# Patient Record
Sex: FEMALE | Race: WHITE | NOT HISPANIC OR LATINO | Employment: FULL TIME | URBAN - METROPOLITAN AREA
[De-identification: names, ages, dates, MRNs, and addresses within clinical notes are randomized per-mention and may not be internally consistent; named-entity substitution may affect disease eponyms.]

---

## 2017-05-06 ENCOUNTER — HOSPITAL ENCOUNTER (EMERGENCY)
Facility: HOSPITAL | Age: 27
Discharge: HOME | End: 2017-05-06
Attending: EMERGENCY MEDICINE | Admitting: EMERGENCY MEDICINE
Payer: COMMERCIAL

## 2017-05-06 ENCOUNTER — APPOINTMENT (EMERGENCY)
Dept: CT IMAGING | Facility: HOSPITAL | Age: 27
End: 2017-05-06
Payer: COMMERCIAL

## 2017-05-06 VITALS
DIASTOLIC BLOOD PRESSURE: 54 MMHG | OXYGEN SATURATION: 97 % | HEIGHT: 62 IN | RESPIRATION RATE: 18 BRPM | TEMPERATURE: 97.8 F | BODY MASS INDEX: 23 KG/M2 | HEART RATE: 57 BPM | SYSTOLIC BLOOD PRESSURE: 99 MMHG | WEIGHT: 125 LBS

## 2017-05-06 DIAGNOSIS — N39.0 UTI (URINARY TRACT INFECTION): Primary | ICD-10-CM

## 2017-05-06 DIAGNOSIS — M54.9 BACK PAIN: ICD-10-CM

## 2017-05-06 LAB
ANION GAP SERPL CALCULATED.3IONS-SCNC: 9 MMOL/L (ref 4–13)
BASOPHILS # BLD AUTO: 0.01 THOUSANDS/ΜL (ref 0–0.1)
BASOPHILS NFR BLD AUTO: 0 % (ref 0–1)
BUN SERPL-MCNC: 12 MG/DL (ref 5–25)
CALCIUM SERPL-MCNC: 8.9 MG/DL (ref 8.3–10.1)
CHLORIDE SERPL-SCNC: 106 MMOL/L (ref 100–108)
CLARITY, POC: NORMAL
CO2 SERPL-SCNC: 27 MMOL/L (ref 21–32)
COLOR, POC: YELLOW
CREAT SERPL-MCNC: 0.61 MG/DL (ref 0.6–1.3)
EOSINOPHIL # BLD AUTO: 0.14 THOUSAND/ΜL (ref 0–0.61)
EOSINOPHIL NFR BLD AUTO: 2 % (ref 0–6)
ERYTHROCYTE [DISTWIDTH] IN BLOOD BY AUTOMATED COUNT: 12.3 % (ref 11.6–15.1)
EXT BILIRUBIN, UA: NEGATIVE
EXT BLOOD URINE: NORMAL
EXT GLUCOSE, UA: NEGATIVE
EXT KETONES: NEGATIVE
EXT NITRITE, UA: NEGATIVE
EXT PH, UA: 6.5
EXT PROTEIN, UA: NORMAL
EXT SPECIFIC GRAVITY, UA: 1.01
EXT UROBILINOGEN: 0.2
GFR SERPL CREATININE-BSD FRML MDRD: >60 ML/MIN/1.73SQ M
GLUCOSE SERPL-MCNC: 89 MG/DL (ref 65–140)
HCG UR QL: NEGATIVE
HCT VFR BLD AUTO: 39.3 % (ref 34.8–46.1)
HGB BLD-MCNC: 13.2 G/DL (ref 11.5–15.4)
LYMPHOCYTES # BLD AUTO: 2.99 THOUSANDS/ΜL (ref 0.6–4.47)
LYMPHOCYTES NFR BLD AUTO: 35 % (ref 14–44)
MCH RBC QN AUTO: 31.1 PG (ref 26.8–34.3)
MCHC RBC AUTO-ENTMCNC: 33.6 G/DL (ref 31.4–37.4)
MCV RBC AUTO: 93 FL (ref 82–98)
MONOCYTES # BLD AUTO: 0.56 THOUSAND/ΜL (ref 0.17–1.22)
MONOCYTES NFR BLD AUTO: 7 % (ref 4–12)
NEUTROPHILS # BLD AUTO: 4.8 THOUSANDS/ΜL (ref 1.85–7.62)
NEUTS SEG NFR BLD AUTO: 56 % (ref 43–75)
NRBC BLD AUTO-RTO: 0 /100 WBCS
PLATELET # BLD AUTO: 232 THOUSANDS/UL (ref 149–390)
PMV BLD AUTO: 10.3 FL (ref 8.9–12.7)
POTASSIUM SERPL-SCNC: 3.8 MMOL/L (ref 3.5–5.3)
RBC # BLD AUTO: 4.25 MILLION/UL (ref 3.81–5.12)
SODIUM SERPL-SCNC: 142 MMOL/L (ref 136–145)
WBC # BLD AUTO: 8.53 THOUSAND/UL (ref 4.31–10.16)
WBC # BLD EST: NEGATIVE 10*3/UL

## 2017-05-06 PROCEDURE — 80048 BASIC METABOLIC PNL TOTAL CA: CPT | Performed by: PHYSICIAN ASSISTANT

## 2017-05-06 PROCEDURE — 99284 EMERGENCY DEPT VISIT MOD MDM: CPT

## 2017-05-06 PROCEDURE — 74176 CT ABD & PELVIS W/O CONTRAST: CPT

## 2017-05-06 PROCEDURE — 81025 URINE PREGNANCY TEST: CPT

## 2017-05-06 PROCEDURE — 81002 URINALYSIS NONAUTO W/O SCOPE: CPT

## 2017-05-06 PROCEDURE — 85025 COMPLETE CBC W/AUTO DIFF WBC: CPT | Performed by: PHYSICIAN ASSISTANT

## 2017-05-06 PROCEDURE — 36415 COLL VENOUS BLD VENIPUNCTURE: CPT | Performed by: PHYSICIAN ASSISTANT

## 2017-05-06 RX ORDER — CEPHALEXIN 500 MG/1
500 CAPSULE ORAL 3 TIMES DAILY
Qty: 21 CAPSULE | Refills: 0 | Status: SHIPPED | OUTPATIENT
Start: 2017-05-06 | End: 2017-05-13

## 2017-05-06 RX ORDER — NORGESTIMATE AND ETHINYL ESTRADIOL 0.25-0.035
1 KIT ORAL DAILY
COMMUNITY

## 2017-05-06 RX ORDER — PHENAZOPYRIDINE HYDROCHLORIDE 100 MG/1
100 TABLET, FILM COATED ORAL 3 TIMES DAILY PRN
Qty: 6 TABLET | Refills: 0 | Status: SHIPPED | OUTPATIENT
Start: 2017-05-06 | End: 2017-05-08

## 2017-05-06 RX ORDER — TRAMADOL HYDROCHLORIDE 50 MG/1
50 TABLET ORAL EVERY 6 HOURS PRN
Qty: 20 TABLET | Refills: 0 | Status: SHIPPED | OUTPATIENT
Start: 2017-05-06

## 2020-03-05 ENCOUNTER — HOSPITAL ENCOUNTER (EMERGENCY)
Facility: HOSPITAL | Age: 30
Discharge: HOME | End: 2020-03-05
Attending: EMERGENCY MEDICINE | Admitting: EMERGENCY MEDICINE
Payer: COMMERCIAL

## 2020-03-05 ENCOUNTER — APPOINTMENT (EMERGENCY)
Dept: RADIOLOGY | Facility: HOSPITAL | Age: 30
End: 2020-03-05
Payer: COMMERCIAL

## 2020-03-05 VITALS
RESPIRATION RATE: 20 BRPM | SYSTOLIC BLOOD PRESSURE: 126 MMHG | TEMPERATURE: 98 F | OXYGEN SATURATION: 99 % | DIASTOLIC BLOOD PRESSURE: 74 MMHG | HEART RATE: 83 BPM | WEIGHT: 115 LBS | BODY MASS INDEX: 21.16 KG/M2 | HEIGHT: 62 IN

## 2020-03-05 DIAGNOSIS — M77.8 TENDINITIS OF RIGHT WRIST: Primary | ICD-10-CM

## 2020-03-05 PROCEDURE — 29125 APPL SHORT ARM SPLINT STATIC: CPT | Performed by: PHYSICIAN ASSISTANT

## 2020-03-05 PROCEDURE — 73110 X-RAY EXAM OF WRIST: CPT

## 2020-03-05 PROCEDURE — 99283 EMERGENCY DEPT VISIT LOW MDM: CPT

## 2020-03-05 PROCEDURE — 99284 EMERGENCY DEPT VISIT MOD MDM: CPT | Performed by: PHYSICIAN ASSISTANT

## 2020-03-05 RX ORDER — IBUPROFEN 400 MG/1
400 TABLET ORAL EVERY 6 HOURS PRN
Qty: 30 TABLET | Refills: 0 | Status: SHIPPED | OUTPATIENT
Start: 2020-03-05 | End: 2020-03-10

## 2020-03-05 NOTE — ED PROVIDER NOTES
History  Chief Complaint   Patient presents with    Wrist Pain     Pt presents for R wrist pain, states scooping ice cream at work has aggravated her arthritis      30 y o  female presents to ED with chief complaint of wrist pain  Onset of symptoms reported as yesterday  Location of symptoms is reported as the right wrist  Quality is reported as sharp pain  Severity is reported as moderate  Associated symptoms:   Denies paralysis, paraesthesias or weakness to upper extremity  Denies discoloration of hand, fingers or skin of upper extremity  Denies fevers  Denies elbow or shoulder pain  Denies neck pain  Denies rash  Modifiers: patient reports movement exacerbates pain  Context:    Patient reports she developed r wrist pain after scooping ice cream at work yesterday  Patient is right hand dominant  Denies other injuries or pain  Patient reports a history of arthritis in the past and states this activity has aggravating her arthritis  Medical summary: review of past visit history via [facility] demonstrates: patient last seen in ed on 2017 for evaluation of uti          History provided by:  Patient   used: No        Prior to Admission Medications   Prescriptions Last Dose Informant Patient Reported? Taking?   norgestimate-ethinyl estradiol (ORTHO-CYCLEN) 0 25-35 MG-MCG per tablet   Yes No   Sig: Take 1 tablet by mouth daily   traMADol (ULTRAM) 50 mg tablet   No No   Sig: Take 1 tablet by mouth every 6 (six) hours as needed for moderate pain (abd pain/initial rx) for up to 20 doses      Facility-Administered Medications: None       Past Medical History:   Diagnosis Date    Ovarian cyst     Pancreatitis     Renal disorder     kidney stones       Past Surgical History:   Procedure Laterality Date     SECTION         History reviewed  No pertinent family history  I have reviewed and agree with the history as documented      E-Cigarette/Vaping     E-Cigarette/Vaping Substances     Social History     Tobacco Use    Smoking status: Current Every Day Smoker     Packs/day: 0 50     Types: Cigarettes    Smokeless tobacco: Never Used   Substance Use Topics    Alcohol use: No    Drug use: No       Review of Systems   Constitutional: Negative for activity change, appetite change, chills, diaphoresis, fatigue, fever and unexpected weight change  HENT: Negative for congestion, dental problem, drooling, ear discharge, ear pain, facial swelling, hearing loss, mouth sores, nosebleeds, postnasal drip, rhinorrhea, sinus pressure, sinus pain, sneezing, sore throat, tinnitus, trouble swallowing and voice change  Eyes: Negative for photophobia, pain, discharge, redness, itching and visual disturbance  Respiratory: Negative for apnea, cough, choking, chest tightness, shortness of breath, wheezing and stridor  Cardiovascular: Negative for chest pain, palpitations and leg swelling  Gastrointestinal: Negative for abdominal distention, abdominal pain, anal bleeding, blood in stool, constipation, diarrhea, nausea, rectal pain and vomiting  Endocrine: Negative for cold intolerance, heat intolerance, polydipsia, polyphagia and polyuria  Genitourinary: Negative for decreased urine volume, difficulty urinating, dyspareunia, dysuria, enuresis, flank pain, frequency, hematuria, menstrual problem, pelvic pain, urgency, vaginal bleeding, vaginal discharge and vaginal pain  Musculoskeletal: Positive for arthralgias  Negative for back pain, gait problem, joint swelling, myalgias, neck pain and neck stiffness  Skin: Negative for color change, pallor, rash and wound  Allergic/Immunologic: Negative for environmental allergies, food allergies and immunocompromised state  Neurological: Negative for dizziness, tremors, seizures, syncope, facial asymmetry, speech difficulty, weakness, light-headedness, numbness and headaches  Hematological: Negative for adenopathy  Does not bruise/bleed easily  Psychiatric/Behavioral: Negative for agitation, confusion, hallucinations, self-injury and suicidal ideas  The patient is not hyperactive  All other systems reviewed and are negative  Physical Exam  Physical Exam   Constitutional: She is oriented to person, place, and time  She appears well-developed and well-nourished  No distress  /74 (BP Location: Right arm)   Pulse 83   Temp 98 °F (36 7 °C) (Oral)   Resp 20   Ht 5' 2" (1 575 m)   Wt 52 2 kg (115 lb)   SpO2 99%   BMI 21 03 kg/m²    HENT:   Head: Normocephalic and atraumatic  Right Ear: External ear normal    Left Ear: External ear normal    Nose: Nose normal    Mouth/Throat: Oropharynx is clear and moist  No oropharyngeal exudate  Eyes: Pupils are equal, round, and reactive to light  Conjunctivae and EOM are normal  Right eye exhibits no discharge  Left eye exhibits no discharge  No scleral icterus  Neck: Normal range of motion  Neck supple  No JVD present  No tracheal deviation present  Cardiovascular: Normal rate, regular rhythm and intact distal pulses  Pulmonary/Chest: Effort normal and breath sounds normal  No stridor  No respiratory distress  She has no wheezes  She has no rales  She exhibits no tenderness  Abdominal: Soft  Bowel sounds are normal  She exhibits no distension and no mass  There is no tenderness  There is no rebound and no guarding  No hernia  Musculoskeletal: Normal range of motion  She exhibits tenderness  She exhibits no edema or deformity  Right Wrist exam:  normal inspection, no gross deformity, no obvious muscle atrophy on inspection  Sensory intact to light touch and neurovascularly intact to all surfaces  Capillary refill is less than 3 seconds  Dorsal surface is nontender to palpation, volar surface is nontender to palpation  Radial surface is nontender to palpation  Ulnar surface is tender to palpation  ROM is full and intact    Hand is normal to inspection, nontender to palpation with full range of motion  Forearm and elbow are normal to inspection, nontender to palpation with full range of motion  Finkelsteins test is negative  Lymphadenopathy:     She has no cervical adenopathy  Neurological: She is alert and oriented to person, place, and time  She displays normal reflexes  No cranial nerve deficit or sensory deficit  She exhibits normal muscle tone  Coordination normal    Skin: Skin is warm and dry  Capillary refill takes less than 2 seconds  No rash noted  She is not diaphoretic  No erythema  No pallor  Psychiatric: She has a normal mood and affect  Her behavior is normal  Judgment and thought content normal    Nursing note and vitals reviewed  Vital Signs  ED Triage Vitals [03/05/20 1020]   Temperature Pulse Respirations Blood Pressure SpO2   98 °F (36 7 °C) 83 20 126/74 99 %      Temp Source Heart Rate Source Patient Position - Orthostatic VS BP Location FiO2 (%)   Oral Monitor Sitting Right arm --      Pain Score       8           Vitals:    03/05/20 1020   BP: 126/74   Pulse: 83   Patient Position - Orthostatic VS: Sitting         Visual Acuity      ED Medications  Medications - No data to display    Diagnostic Studies  Results Reviewed     None                 XR wrist 3+ views RIGHT   Final Result by Jaylin Bro MD (03/05 1111)      No acute osseous abnormality  Workstation performed: [id number]                    Procedures  Procedures         ED Course                               MDM  Number of Diagnoses or Management Options  Tendinitis of right wrist: new and requires workup  Diagnosis management comments: ddx includes but is not limited to fracture, contusion, sprain, strain, nerve injury, tendon injury, vascular injury, tendinitis, bursitis, dislocation, plan xray to rule out fracture or dislocation  Xray images r wrist independently visualized and interpreted by me - no acute fracture or dislocation       Discussed x-ray results with patient at bedside  Discussed diagnosis of wrist tendinitis  Discussed expected course and treatment plan  Discussed rest, ice, use of splint  Discussed use of NSAIDs  Discussed avoid activities that aggravate symptoms for the next 5 days  Follow up with primary care physician and Orthopedics in 3-5 days for recheck and further evaluation of symptoms  Reviewed reasons to return to ed  Patient verbalized understanding of diagnosis and agreement with discharge plan of care as well as understanding of reasons to return to ed  I have reasonably determine that electronically prescribing a controlled substance would be impractical for the patient to obtain the controlled substance prescribed by electronic prescription or would cause an untimely delay resulting in an adverse impact on the patient's medical condition   Splint check: location right wrist,   Type static wrist splint, SILT, NVI, cap refill less than 3 seconds  Skin intact without redness or breakdown  Splint applied by ed tech  Splint checked by me  Amount and/or Complexity of Data Reviewed  Tests in the radiology section of CPT®: ordered and reviewed  Discussion of test results with the performing providers: yes  Review and summarize past medical records: yes  Independent visualization of images, tracings, or specimens: yes    Patient Progress  Patient progress: stable        Disposition  Final diagnoses:   Tendinitis of right wrist     Time reflects when diagnosis was documented in both MDM as applicable and the Disposition within this note     Time User Action Codes Description Comment    3/5/2020 10:33 AM Eliane Banda Add [M77 8] Tendinitis of right wrist       ED Disposition     ED Disposition Condition Date/Time Comment    Discharge Stable Thu Mar 5, 2020 10:33 AM Manjula Duffy discharge to home/self care              Follow-up Information     Follow up With Specialties Details Why Contact Info Additional Information    [facility] Emergency Department Emergency Medicine Go to  If symptoms worsen [address] [id number] [phone] [place] [facility], [address] [facility], [place], [place], [address]  Call in 1 day for further evaluation of symptoms [address] [facility],[address]  [address]  [phone]           Discharge Medication List as of 3/5/2020 10:36 AM      START taking these medications    Details   ibuprofen (MOTRIN) 400 mg tablet Take 1 tablet (400 mg total) by mouth every 6 (six) hours as needed for moderate pain (wrist pain/initial rx ) for up to 5 days, Starting u 3/5/2020, Until Tue 3/10/2020, Print         CONTINUE these medications which have NOT CHANGED    Details   norgestimate-ethinyl estradiol (ORTHO-CYCLEN) 0 25-35 MG-MCG per tablet Take 1 tablet by mouth daily, Until Discontinued, Historical Med      traMADol (ULTRAM) 50 mg tablet Take 1 tablet by mouth every 6 (six) hours as needed for moderate pain (abd pain/initial rx) for up to 20 doses, Starting 5/6/2017, Until Discontinued, Print           No discharge procedures on file      PDMP Review     None          ED Provider  Electronically Signed by           Daniel Collado PA-C  03/06/20 5289

## 2020-03-05 NOTE — ED NOTES
D/c reviewed with pt  Pt verbalized understanding and has no further questions at this time  Pt ambulatory off unit with steady gait       Cedrick Shepard RN  03/05/20 6346

## 2024-08-19 ENCOUNTER — APPOINTMENT (EMERGENCY)
Dept: CT IMAGING | Facility: HOSPITAL | Age: 34
End: 2024-08-19
Payer: COMMERCIAL

## 2024-08-19 ENCOUNTER — HOSPITAL ENCOUNTER (EMERGENCY)
Facility: HOSPITAL | Age: 34
Discharge: HOME | End: 2024-08-19
Attending: EMERGENCY MEDICINE
Payer: COMMERCIAL

## 2024-08-19 VITALS
SYSTOLIC BLOOD PRESSURE: 115 MMHG | HEART RATE: 91 BPM | RESPIRATION RATE: 32 BRPM | BODY MASS INDEX: 31.4 KG/M2 | DIASTOLIC BLOOD PRESSURE: 78 MMHG | WEIGHT: 170.64 LBS | TEMPERATURE: 98.1 F | OXYGEN SATURATION: 98 % | HEIGHT: 62 IN

## 2024-08-19 DIAGNOSIS — F10.929 ALCOHOL INTOXICATION (HCC): Primary | ICD-10-CM

## 2024-08-19 DIAGNOSIS — E87.6 HYPOKALEMIA: ICD-10-CM

## 2024-08-19 LAB
2HR DELTA HS TROPONIN: <-2 NG/L
ALBUMIN SERPL BCG-MCNC: 4.2 G/DL (ref 3.5–5)
ALP SERPL-CCNC: 46 U/L (ref 34–104)
ALT SERPL W P-5'-P-CCNC: 18 U/L (ref 7–52)
ANION GAP SERPL CALCULATED.3IONS-SCNC: 11 MMOL/L (ref 4–13)
APAP SERPL-MCNC: <2 UG/ML (ref 10–20)
AST SERPL W P-5'-P-CCNC: 21 U/L (ref 13–39)
ATRIAL RATE: 85 BPM
BASE EX.OXY STD BLDV CALC-SCNC: 86.1 % (ref 60–80)
BASE EXCESS BLDV CALC-SCNC: -3.4 MMOL/L
BASOPHILS # BLD AUTO: 0.02 THOUSANDS/ÂΜL (ref 0–0.1)
BASOPHILS NFR BLD AUTO: 0 % (ref 0–1)
BILIRUB SERPL-MCNC: 0.27 MG/DL (ref 0.2–1)
BUN SERPL-MCNC: 17 MG/DL (ref 5–25)
CALCIUM SERPL-MCNC: 8.7 MG/DL (ref 8.4–10.2)
CARDIAC TROPONIN I PNL SERPL HS: 4 NG/L
CARDIAC TROPONIN I PNL SERPL HS: <2 NG/L
CHLORIDE SERPL-SCNC: 108 MMOL/L (ref 96–108)
CK SERPL-CCNC: 140 U/L (ref 26–192)
CO2 SERPL-SCNC: 23 MMOL/L (ref 21–32)
CREAT SERPL-MCNC: 0.58 MG/DL (ref 0.6–1.3)
EOSINOPHIL # BLD AUTO: 0.09 THOUSAND/ÂΜL (ref 0–0.61)
EOSINOPHIL NFR BLD AUTO: 1 % (ref 0–6)
ERYTHROCYTE [DISTWIDTH] IN BLOOD BY AUTOMATED COUNT: 13.4 % (ref 11.6–15.1)
ETHANOL EXG-MCNC: 0.08 MG/DL
ETHANOL SERPL-MCNC: 273 MG/DL
GFR SERPL CREATININE-BSD FRML MDRD: 119 ML/MIN/1.73SQ M
GLUCOSE SERPL-MCNC: 117 MG/DL (ref 65–140)
HCG SERPL QL: NEGATIVE
HCO3 BLDV-SCNC: 21.1 MMOL/L (ref 24–30)
HCT VFR BLD AUTO: 40.9 % (ref 34.8–46.1)
HGB BLD-MCNC: 13.6 G/DL (ref 11.5–15.4)
IMM GRANULOCYTES # BLD AUTO: 0.08 THOUSAND/UL (ref 0–0.2)
IMM GRANULOCYTES NFR BLD AUTO: 1 % (ref 0–2)
LACTATE SERPL-SCNC: 1.6 MMOL/L (ref 0.5–2)
LYMPHOCYTES # BLD AUTO: 2.16 THOUSANDS/ÂΜL (ref 0.6–4.47)
LYMPHOCYTES NFR BLD AUTO: 17 % (ref 14–44)
MCH RBC QN AUTO: 30.1 PG (ref 26.8–34.3)
MCHC RBC AUTO-ENTMCNC: 33.3 G/DL (ref 31.4–37.4)
MCV RBC AUTO: 91 FL (ref 82–98)
MONOCYTES # BLD AUTO: 0.39 THOUSAND/ÂΜL (ref 0.17–1.22)
MONOCYTES NFR BLD AUTO: 3 % (ref 4–12)
NEUTROPHILS # BLD AUTO: 9.77 THOUSANDS/ÂΜL (ref 1.85–7.62)
NEUTS SEG NFR BLD AUTO: 78 % (ref 43–75)
NRBC BLD AUTO-RTO: 0 /100 WBCS
O2 CT BLDV-SCNC: 17.9 ML/DL
P AXIS: 67 DEGREES
PCO2 BLDV: 36.4 MM HG (ref 42–50)
PH BLDV: 7.38 [PH] (ref 7.3–7.4)
PLATELET # BLD AUTO: 267 THOUSANDS/UL (ref 149–390)
PMV BLD AUTO: 9.5 FL (ref 8.9–12.7)
PO2 BLDV: 67.3 MM HG (ref 35–45)
POTASSIUM SERPL-SCNC: 3.4 MMOL/L (ref 3.5–5.3)
PR INTERVAL: 238 MS
PROT SERPL-MCNC: 6.9 G/DL (ref 6.4–8.4)
QRS AXIS: 102 DEGREES
QRSD INTERVAL: 94 MS
QT INTERVAL: 384 MS
QTC INTERVAL: 456 MS
RBC # BLD AUTO: 4.52 MILLION/UL (ref 3.81–5.12)
SALICYLATES SERPL-MCNC: <5 MG/DL (ref 3–20)
SODIUM SERPL-SCNC: 142 MMOL/L (ref 135–147)
T WAVE AXIS: 64 DEGREES
VENTRICULAR RATE: 85 BPM
WBC # BLD AUTO: 12.51 THOUSAND/UL (ref 4.31–10.16)

## 2024-08-19 PROCEDURE — 82077 ASSAY SPEC XCP UR&BREATH IA: CPT | Performed by: EMERGENCY MEDICINE

## 2024-08-19 PROCEDURE — 84484 ASSAY OF TROPONIN QUANT: CPT | Performed by: EMERGENCY MEDICINE

## 2024-08-19 PROCEDURE — 96366 THER/PROPH/DIAG IV INF ADDON: CPT

## 2024-08-19 PROCEDURE — 82075 ASSAY OF BREATH ETHANOL: CPT | Performed by: STUDENT IN AN ORGANIZED HEALTH CARE EDUCATION/TRAINING PROGRAM

## 2024-08-19 PROCEDURE — 82805 BLOOD GASES W/O2 SATURATION: CPT | Performed by: EMERGENCY MEDICINE

## 2024-08-19 PROCEDURE — 72125 CT NECK SPINE W/O DYE: CPT

## 2024-08-19 PROCEDURE — 93010 ELECTROCARDIOGRAM REPORT: CPT | Performed by: INTERNAL MEDICINE

## 2024-08-19 PROCEDURE — 85025 COMPLETE CBC W/AUTO DIFF WBC: CPT | Performed by: EMERGENCY MEDICINE

## 2024-08-19 PROCEDURE — 96361 HYDRATE IV INFUSION ADD-ON: CPT

## 2024-08-19 PROCEDURE — 36415 COLL VENOUS BLD VENIPUNCTURE: CPT | Performed by: EMERGENCY MEDICINE

## 2024-08-19 PROCEDURE — 82550 ASSAY OF CK (CPK): CPT | Performed by: EMERGENCY MEDICINE

## 2024-08-19 PROCEDURE — 80143 DRUG ASSAY ACETAMINOPHEN: CPT | Performed by: EMERGENCY MEDICINE

## 2024-08-19 PROCEDURE — 80053 COMPREHEN METABOLIC PANEL: CPT | Performed by: EMERGENCY MEDICINE

## 2024-08-19 PROCEDURE — 99284 EMERGENCY DEPT VISIT MOD MDM: CPT | Performed by: EMERGENCY MEDICINE

## 2024-08-19 PROCEDURE — 84703 CHORIONIC GONADOTROPIN ASSAY: CPT | Performed by: EMERGENCY MEDICINE

## 2024-08-19 PROCEDURE — 83605 ASSAY OF LACTIC ACID: CPT | Performed by: EMERGENCY MEDICINE

## 2024-08-19 PROCEDURE — 80179 DRUG ASSAY SALICYLATE: CPT | Performed by: EMERGENCY MEDICINE

## 2024-08-19 PROCEDURE — 99284 EMERGENCY DEPT VISIT MOD MDM: CPT

## 2024-08-19 PROCEDURE — 96365 THER/PROPH/DIAG IV INF INIT: CPT

## 2024-08-19 PROCEDURE — 93005 ELECTROCARDIOGRAM TRACING: CPT

## 2024-08-19 PROCEDURE — 70450 CT HEAD/BRAIN W/O DYE: CPT

## 2024-08-19 RX ORDER — LANOLIN ALCOHOL/MO/W.PET/CERES
100 CREAM (GRAM) TOPICAL DAILY
Status: DISCONTINUED | OUTPATIENT
Start: 2024-08-19 | End: 2024-08-19 | Stop reason: HOSPADM

## 2024-08-19 RX ORDER — FOLIC ACID 1 MG/1
1 TABLET ORAL DAILY
Status: DISCONTINUED | OUTPATIENT
Start: 2024-08-19 | End: 2024-08-19 | Stop reason: HOSPADM

## 2024-08-19 RX ORDER — ONDANSETRON 2 MG/ML
1 INJECTION INTRAMUSCULAR; INTRAVENOUS ONCE
Status: COMPLETED | OUTPATIENT
Start: 2024-08-19 | End: 2024-08-19

## 2024-08-19 RX ORDER — SODIUM CHLORIDE, SODIUM GLUCONATE, SODIUM ACETATE, POTASSIUM CHLORIDE, MAGNESIUM CHLORIDE, SODIUM PHOSPHATE, DIBASIC, AND POTASSIUM PHOSPHATE .53; .5; .37; .037; .03; .012; .00082 G/100ML; G/100ML; G/100ML; G/100ML; G/100ML; G/100ML; G/100ML
84 INJECTION, SOLUTION INTRAVENOUS CONTINUOUS
Status: DISCONTINUED | OUTPATIENT
Start: 2024-08-19 | End: 2024-08-19 | Stop reason: HOSPADM

## 2024-08-19 RX ORDER — POTASSIUM CHLORIDE 1500 MG/1
40 TABLET, EXTENDED RELEASE ORAL ONCE
Status: COMPLETED | OUTPATIENT
Start: 2024-08-19 | End: 2024-08-19

## 2024-08-19 RX ADMIN — SODIUM CHLORIDE 1000 ML: 0.9 INJECTION, SOLUTION INTRAVENOUS at 04:22

## 2024-08-19 RX ADMIN — Medication 1 TABLET: at 08:49

## 2024-08-19 RX ADMIN — FOLIC ACID 1 MG: 1 TABLET ORAL at 08:49

## 2024-08-19 RX ADMIN — THIAMINE HCL TAB 100 MG 100 MG: 100 TAB at 08:49

## 2024-08-19 RX ADMIN — POTASSIUM CHLORIDE 40 MEQ: 1500 TABLET, EXTENDED RELEASE ORAL at 07:56

## 2024-08-19 RX ADMIN — SODIUM CHLORIDE, SODIUM GLUCONATE, SODIUM ACETATE, POTASSIUM CHLORIDE, MAGNESIUM CHLORIDE, SODIUM PHOSPHATE, DIBASIC, AND POTASSIUM PHOSPHATE 84 ML/HR: .53; .5; .37; .037; .03; .012; .00082 INJECTION, SOLUTION INTRAVENOUS at 06:24

## 2024-08-19 NOTE — ED PROVIDER NOTES
"History  Chief Complaint   Patient presents with    Alcohol Intoxication     Pt came to ed by ambulance, family called 911 reporting  pt is intoxicated sleeping outside, on arrival pt is A&Ox3, breathing is spontaneous, unlabored. Abrasion noted to left knee     33-year-old female presents to the emergency room after reportedly being found intoxicated.    Patient awakens but does not answer questions appropriately.  When asked if anything hurts, patient states \"my alcohol.\"    Patient is unsure if she fell and struck her head that there was concerns for this.  Patient denies any headache though the reliability of her history is questionable.    Patient is maintaining her airway and is on capnography, no need for immediate airway intervention.  She is not actively vomiting though she was reportedly given ondansetron by EMS.    Impression and plan: Altered mental status with a broad differential though based on patient's history, concerns for potential alcohol intoxication though there is limited verifiable history.  As such, will obtain toxicologic and metabolic evaluation.  Will obtain CT imaging of patient's head and neck as I cannot clear her cervical spine clinically due to her intoxication.  Will monitor patient, reassess, and reevaluate.        Prior to Admission Medications   Prescriptions Last Dose Informant Patient Reported? Taking?   ibuprofen (MOTRIN) 400 mg tablet   No No   Sig: Take 1 tablet (400 mg total) by mouth every 6 (six) hours as needed for moderate pain (wrist pain/initial rx.) for up to 5 days   norgestimate-ethinyl estradiol (ORTHO-CYCLEN) 0.25-35 MG-MCG per tablet   Yes No   Sig: Take 1 tablet by mouth daily   traMADol (ULTRAM) 50 mg tablet   No No   Sig: Take 1 tablet by mouth every 6 (six) hours as needed for moderate pain (abd pain/initial rx) for up to 20 doses      Facility-Administered Medications: None       Past Medical History:   Diagnosis Date    Alcohol abuse     Alcoholism (HCC)  "    Ovarian cyst     Pancreatitis     Renal disorder     kidney stones       Past Surgical History:   Procedure Laterality Date     SECTION         No family history on file.  I have reviewed and agree with the history as documented.    E-Cigarette/Vaping     E-Cigarette/Vaping Substances     Social History     Tobacco Use    Smoking status: Every Day     Current packs/day: 0.50     Types: Cigarettes    Smokeless tobacco: Never   Substance Use Topics    Alcohol use: Yes     Comment: Pt drinks generally on wknds but sometimes more than just wknds    Drug use: Yes     Types: Marijuana       Review of Systems    Physical Exam  Physical Exam  Constitutional:       Comments: Awakens to verbal response.   HENT:      Head: Normocephalic and atraumatic.      Comments: No signs of basilar skull fracture.     Mouth/Throat:      Mouth: Mucous membranes are moist.   Eyes:      Conjunctiva/sclera: Conjunctivae normal.   Cardiovascular:      Rate and Rhythm: Normal rate and regular rhythm.   Pulmonary:      Effort: Pulmonary effort is normal.      Breath sounds: Normal breath sounds.   Abdominal:      Palpations: Abdomen is soft.      Tenderness: There is no abdominal tenderness. There is no guarding or rebound.   Musculoskeletal:      Cervical back: Neck supple. No rigidity or tenderness.   Skin:     General: Skin is warm and dry.   Neurological:      General: No focal deficit present.      Mental Status: She is alert.         Vital Signs  ED Triage Vitals   Temperature Pulse Respirations Blood Pressure SpO2   24 0400 24 0400 24 0400 24 0400 24 0400   98.1 °F (36.7 °C) 85 16 106/66 99 %      Temp Source Heart Rate Source Patient Position - Orthostatic VS BP Location FiO2 (%)   24 0400 24 0400 24 0700 24 0700 --   Oral Monitor Sitting Right arm       Pain Score       24 0400       No Pain           Vitals:    24 1000 24 1045 24 1100 24 1130    BP: 131/83 107/70 109/81 115/78   Pulse: (!) 109 82 92 91   Patient Position - Orthostatic VS: Sitting  Sitting Sitting         Visual Acuity  Visual Acuity      Flowsheet Row Most Recent Value   L Pupil Size (mm) 3   R Pupil Size (mm) 3            ED Medications  Medications   ondansetron (FOR EMS ONLY) (ZOFRAN) 4 mg/2 mL injection 4 mg (0 mg Does not apply Given to EMS 8/19/24 0412)   sodium chloride 0.9 % bolus 1,000 mL (0 mL Intravenous Stopped 8/19/24 0553)   potassium chloride (Klor-Con M20) CR tablet 40 mEq (40 mEq Oral Given 8/19/24 0756)       Diagnostic Studies  Results Reviewed       Procedure Component Value Units Date/Time    POCT alcohol breath test [[id number]]  (Normal) Resulted: 08/19/24 0923    Lab Status: Final result Updated: 08/19/24 0924     EXTBreath Alcohol 0.082    HS Troponin I 2hr [[id number]]  (Normal) Collected: 08/19/24 0625    Lab Status: Final result Specimen: Blood from Arm, Left Updated: 08/19/24 0659     hs TnI 2hr <2 ng/L      Delta 2hr hsTnI <-2 ng/L     Comprehensive metabolic panel [[id number]]  (Abnormal) Collected: 08/19/24 0410    Lab Status: Final result Specimen: Blood from Arm, Left Updated: 08/19/24 0530     Sodium 142 mmol/L      Potassium 3.4 mmol/L      Chloride 108 mmol/L      CO2 23 mmol/L      ANION GAP 11 mmol/L      BUN 17 mg/dL      Creatinine 0.58 mg/dL      Glucose 117 mg/dL      Calcium 8.7 mg/dL      AST 21 U/L      ALT 18 U/L      Alkaline Phosphatase 46 U/L      Total Protein 6.9 g/dL      Albumin 4.2 g/dL      Total Bilirubin 0.27 mg/dL      eGFR 119 ml/min/1.73sq m     Narrative:      National Kidney Disease Foundation guidelines for Chronic Kidney Disease (CKD):     Stage 1 with normal or high GFR (GFR > 90 mL/min/1.73 square meters)    Stage 2 Mild CKD (GFR = 60-89 mL/min/1.73 square meters)    Stage 3A Moderate CKD (GFR = 45-59 mL/min/1.73 square meters)    Stage 3B Moderate CKD (GFR = 30-44 mL/min/1.73 square meters)    Stage 4 Severe CKD (GFR = 15-29  mL/min/1.73 square meters)    Stage 5 End Stage CKD (GFR <15 mL/min/1.73 square meters)  Note: GFR calculation is accurate only with a steady state creatinine    CK [[id number]]  (Normal) Collected: 08/19/24 0410    Lab Status: Final result Specimen: Blood from Arm, Left Updated: 08/19/24 0530     Total  U/L     Salicylate level [[id number]]  (Normal) Collected: 08/19/24 0410    Lab Status: Final result Specimen: Blood from Arm, Left Updated: 08/19/24 0530     Salicylate Lvl <5 mg/dL     Acetaminophen level-If concentration is detectable, please discuss with medical  on call. [[id number]]  (Abnormal) Collected: 08/19/24 0410    Lab Status: Final result Specimen: Blood from Arm, Left Updated: 08/19/24 0530     Acetaminophen Level <2 ug/mL     hCG, qualitative pregnancy [[id number]]  (Normal) Collected: 08/19/24 0410    Lab Status: Final result Specimen: Blood from Arm, Left Updated: 08/19/24 0513     Preg, Serum Negative    HS Troponin 0hr (reflex protocol) [[id number]]  (Normal) Collected: 08/19/24 0410    Lab Status: Final result Specimen: Blood from Arm, Left Updated: 08/19/24 0512     hs TnI 0hr 4 ng/L     Lactic acid, plasma (w/reflex if result > 2.0) [[id number]]  (Normal) Collected: 08/19/24 0410    Lab Status: Final result Specimen: Blood from Arm, Left Updated: 08/19/24 0456     LACTIC ACID 1.6 mmol/L     Narrative:      Result may be elevated if tourniquet was used during collection.    Ethanol [[id number]]  (Abnormal) Collected: 08/19/24 0410    Lab Status: Final result Specimen: Blood from Arm, Left Updated: 08/19/24 0436     Ethanol Lvl 273 mg/dL     Blood gas, venous [[id number]]  (Abnormal) Collected: 08/19/24 0410    Lab Status: Final result Specimen: Blood from Arm, Left Updated: 08/19/24 0420     pH, Lalo 7.381     pCO2, Lalo 36.4 mm Hg      pO2, Lalo 67.3 mm Hg      HCO3, Lalo 21.1 mmol/L      Base Excess, Lalo -3.4 mmol/L      O2 Content, Lalo 17.9 ml/dL      O2 HGB, VENOUS 86.1 %     CBC and  differential [[id number]]  (Abnormal) Collected: 08/19/24 0410    Lab Status: Final result Specimen: Blood from Arm, Left Updated: 08/19/24 0419     WBC 12.51 Thousand/uL      RBC 4.52 Million/uL      Hemoglobin 13.6 g/dL      Hematocrit 40.9 %      MCV 91 fL      MCH 30.1 pg      MCHC 33.3 g/dL      RDW 13.4 %      MPV 9.5 fL      Platelets 267 Thousands/uL      nRBC 0 /100 WBCs      Segmented % 78 %      Immature Grans % 1 %      Lymphocytes % 17 %      Monocytes % 3 %      Eosinophils Relative 1 %      Basophils Relative 0 %      Absolute Neutrophils 9.77 Thousands/µL      Absolute Immature Grans 0.08 Thousand/uL      Absolute Lymphocytes 2.16 Thousands/µL      Absolute Monocytes 0.39 Thousand/µL      Eosinophils Absolute 0.09 Thousand/µL      Basophils Absolute 0.02 Thousands/µL                    CT head without contrast   Final Result by Kacie Zambrano MD (08/19 0538)      No acute intracranial abnormality.                  Workstation performed: [id number]         CT spine cervical without contrast   Final Result by Kacie Zambrano MD (08/19 0539)      No cervical spine fracture or traumatic malalignment.                  Workstation performed: [id number]                    Procedures  Procedures         ED Course  ED Course as of 08/22/24 0750   Mon Aug 19, 2024   0603 Reassessed.  Patient awakens to tactile response but does not answer questions appropriately, consistently intoxicated.  Cannot fully evaluate patient secondary to this so she will require reevaluation to determine whether there is additional imaging necessary to clear the patient.   0633 Patient awakens to verbal stimuli.  Patient denies any pain or any symptoms.  Patient adamant that she does not drink daily despite her multiple prior ER visits for alcohol intoxication and appears to have limited interest in detox or rehabilitation though will have patient reassessed after she is more awake.  Warm handoff placed.            CIWA-Ar Score        Row Name 08/19/24 1045 08/19/24 0930 08/19/24 0830       CIWA-Ar    Blood Pressure 107/70 -- 101/68    Pulse 82 -- 81    Nausea and Vomiting 0 0 0    Tactile Disturbances 0 0 0    Tremor 0 0 0    Auditory Disturbances 0 0 0    Paroxysmal Sweats 0 0 0    Visual Disturbances 0 0 0    Anxiety 3 2 2    Headache, Fullness in Head 0 2 2    Agitation 1 2 1    Orientation and Clouding of Sensorium 0 0 0    CIWA-Ar Total 4 6 5                                      SBIRT 20yo+      Flowsheet Row Most Recent Value   Initial Alcohol Screen: US AUDIT-C     1. How often do you have a drink containing alcohol? 3 Filed at: 08/19/2024 1101   2. How many drinks containing alcohol do you have on a typical day you are drinking?  3 Filed at: 08/19/2024 1101   3a. Male UNDER 65: How often do you have five or more drinks on one occasion? 0 Filed at: 08/19/2024 1101   3b. FEMALE Any Age, or MALE 65+: How often do you have 4 or more drinks on one occassion? 2 Filed at: 08/19/2024 1101   Audit-C Score 8 Filed at: 08/19/2024 1101   Full Alcohol Screen: US AUDIT    4. How often during the last year have you found that you were not able to stop drinking once you had started? 0 Filed at: 08/19/2024 1101   5. How often during past year have you failed to do what was normally expected of you because of drinking?  0 Filed at: 08/19/2024 1101   6. How often in past year have you needed a first drink in the morning to get yourself going after a heavy drinking session?  0 Filed at: 08/19/2024 1101   7. How often in past year have you had feeling of guilt or remorse after drinking?  0 Filed at: 08/19/2024 1101   8. How often in past year have you been unable to remember what happened night before because you had been drinking?  2 Filed at: 08/19/2024 1101   9. Have you or someone else been injured as a result of your drinking?  0 Filed at: 08/19/2024 1101   10. Has a relative, friend, doctor or other health worker been concerned about your  "drinking and suggested you cut down?  0 Filed at: 08/19/2024 1101   AUDIT Total Score 10 Filed at: 08/19/2024 1101   JAMILA: How many times in the past year have you...    Used an illegal drug or used a prescription medication for non-medical reasons? Weekly Filed at: 08/19/2024 1101   DAST-10: In the past 12 months...    1. Have you used drugs other than those required for medical reasons? 1 Filed at: 08/19/2024 1101   2. Do you use more than one drug at a time? 0 Filed at: 08/19/2024 1101   3. Have you had medical problems as a result of your drug use (e.g., memory loss, hepatitis, convulsions, bleeding, etc.)? 0 Filed at: 08/19/2024 1101   4. Have you had \"blackouts\" or \"flashbacks\" as a result of drug use?YesNo 0 Filed at: 08/19/2024 1101   5. Do you ever feel bad or guilty about your drug use? 0 Filed at: 08/19/2024 1101   6. Does your spouse (or parent) ever complain about your involvement with drugs? 0 Filed at: 08/19/2024 1101   7. Have you neglected your family because of your use of drugs? 0 Filed at: 08/19/2024 1101   8. Have you engaged in illegal activities in order to obtain drugs? 0 Filed at: 08/19/2024 1101   9. Have you ever experienced withdrawal symptoms (felt sick) when you stopped taking drugs? 0 Filed at: 08/19/2024 1101   10. Are you always able to stop using drugs when you want to? 0 Filed at: 08/19/2024 1101   DAST-10 Score 1 Filed at: 08/19/2024 1101                      Medical Decision Making  Amount and/or Complexity of Data Reviewed  Labs: ordered.  Radiology: ordered.    Risk  Prescription drug management.                 Disposition  Final diagnoses:   Alcohol intoxication (HCC)   Hypokalemia     Time reflects when diagnosis was documented in both MDM as applicable and the Disposition within this note       Time User Action Codes Description Comment    8/19/2024  6:37 AM Misael Dunlap Add [F10.929] Alcohol intoxication (HCC)     8/19/2024  6:37 AM Misael Dunlap Add [E87.6] Hypokalemia  "          ED Disposition       ED Disposition   Discharge    Condition   Stable    Date/Time   Mon Aug 19, 2024 1108    Comment   Pamella Smith discharge to home/self care.                   Follow-up Information       Follow up With Specialties Details Why Contact Info Additional Information    Sarah Ryan Dr., MD Internal Medicine Schedule an appointment as soon as possible for a visit  Schedule follow-up with primary care to continue evaluation and recheck your potassium. [address] [facility] [address]  [phone]       [facility] Emergency Department Emergency Medicine Go to  If symptoms worsen [address] [facility] [id number] [phone] [facility] Emergency Department, [address] [place], [place], [address]            Discharge Medication List as of 8/19/2024 11:08 AM        CONTINUE these medications which have NOT CHANGED    Details   ibuprofen (MOTRIN) 400 mg tablet Take 1 tablet (400 mg total) by mouth every 6 (six) hours as needed for moderate pain (wrist pain/initial rx.) for up to 5 days, Starting u 3/5/2020, Until Tue 3/10/2020, Print      norgestimate-ethinyl estradiol (ORTHO-CYCLEN) 0.25-35 MG-MCG per tablet Take 1 tablet by mouth daily, Until Discontinued, Historical Med      traMADol (ULTRAM) 50 mg tablet Take 1 tablet by mouth every 6 (six) hours as needed for moderate pain (abd pain/initial rx) for up to 20 doses, Starting 5/6/2017, Until Discontinued, Print             No discharge procedures on file.    PDMP Review       None            ED Provider  Electronically Signed by             Misael Dunlap MD  08/22/24 0755

## 2024-08-19 NOTE — ED NOTES
This writer discussed the patients current presentation and recommended discharge plan with Dr. Baltazar.  They agree with the patient being discharged at this time with referrals and/or information about out patient and in patient D&A detox / rehab facilities.     The patient was Instructed to follow up with their PCP.   The patient was provided with referral information for: D&A facilities / agencies.      This writer discussed discharge plans with the patient, who agrees with and understands the discharge plans.       SAFETY PLAN  Warning Signs (thoughts, images, mood, behavior, situations) of a potential crisis: situations / behaviors    Coping Skills (what can I do to take my mind off the problem, or to keep myself safe): engage in healthy activities    Outside Support (who can I reach out to for support and help):       National Suicide Prevention Hotline:  988      [facility] [phone] - [facility] [phone] - LVF Crisis/Mobile Crisis   [phone] - SLPF Crisis   [facility]: [phone]  [facility]: [phone]   [facility] [place] [phone] - Crisis   [facility] [phone] - Crisis     [facility] [phone] - Crisis   [facility] [phone] - [facility]   [phone] - Peer Support Talk Line (1-9pm daily)  [phone] - Teen Support Talk Line (1-9pm daily)  [phone] - [facility] [phone]- Crisis    [facility] [phone] - Crisis   [facility] [phone] - Crisis    [facility]) [phone] - [facility] Crisis     TDS, CW

## 2024-08-19 NOTE — ED NOTES
"Pt presents to the ED from home via EMS due to intoxication. Pt reports having been drinking yesterday \"like 2 boxes of a fruity drink, a few jello shots and maybe a couple of beers.\" Pt adds, \"I don't know what happened. I remember being w/my family and then waking up here. I don't understand why I drink w/everyone just as much as they do but yet I am the one who always gets sent to the hospital.\" Pt reports \"usually just drinking on the weekends but sometimes it turns into a little more than weekends.\" Pt denies SI's / HI's and or AVH's. Pt denies hx of MH illness. Pt denies prior IP / OP detox / rehab. Pt is not seeking IP detox / rehab at this time. Pt does not report any medical or legal issues. Pt reports using THC on occasion and smokes aprox 1-2 packs of cigarettes daily. Pt appears tearful but is cooperative and enages in conversation. Pt maintains good eye contact. CW and pt discussed OP D&A as well as IP detox / rehab. CW provided pt w/OP referrals. CW provided Dr. Baltazar w/findings / details.     TDS, BRANDIE  "

## 2024-08-19 NOTE — ED CARE HANDOFF
[facility] Warm Handoff Outcome Note    Patient name Pamella Smith  Location ED 29/ED 29 MRN [id number]  Age: 33 y.o.          Plan Type:  Warm Handoff                                                                                    Plan Date: 8/19/2024  Service:  ED Warm Handoff      Substance Use History:  ETOH    Warm Handoff Update:  Pt given D&A related resources.    Warm Handoff Outcome: Treatment Related Resources

## 2024-08-19 NOTE — ED PROVIDER NOTES
Patient was seen and evaluated at bedside.  Patient was provided with breakfast.  Patient reports wanted to speak to crisis number.  A consult has been placed.